# Patient Record
Sex: MALE | Race: WHITE | NOT HISPANIC OR LATINO | URBAN - METROPOLITAN AREA
[De-identification: names, ages, dates, MRNs, and addresses within clinical notes are randomized per-mention and may not be internally consistent; named-entity substitution may affect disease eponyms.]

---

## 2018-08-28 ENCOUNTER — RECORDS - HEALTHEAST (OUTPATIENT)
Dept: LAB | Facility: HOSPITAL | Age: 73
End: 2018-08-28

## 2018-08-28 LAB — RUBV IGG SERPL QL IA: POSITIVE

## 2018-08-29 LAB
MEV IGG SER IA-ACNC: POSITIVE
MUV IGG SER QL IA: POSITIVE
VZV IGG SER QL IA: POSITIVE

## 2018-08-30 LAB
GAMMA INTERFERON BACKGROUND BLD IA-ACNC: 0.11 IU/ML
M TB IFN-G BLD-IMP: NEGATIVE
MITOGEN IGNF BCKGRD COR BLD-ACNC: -0.01 IU/ML
MITOGEN IGNF BCKGRD COR BLD-ACNC: 0.03 IU/ML
QTF INTERPRETATION: NORMAL
QTF MITOGEN - NIL: >10 IU/ML

## 2019-10-08 ENCOUNTER — AMBULATORY - HEALTHEAST (OUTPATIENT)
Dept: CARDIAC REHAB | Facility: CLINIC | Age: 74
End: 2019-10-08

## 2019-10-08 DIAGNOSIS — Z95.1 S/P CABG X 4: ICD-10-CM

## 2019-10-15 ENCOUNTER — AMBULATORY - HEALTHEAST (OUTPATIENT)
Dept: CARDIAC REHAB | Facility: CLINIC | Age: 74
End: 2019-10-15

## 2019-10-15 DIAGNOSIS — Z95.1 S/P CABG X 4: ICD-10-CM

## 2019-10-15 DIAGNOSIS — I21.4 NSTEMI (NON-ST ELEVATED MYOCARDIAL INFARCTION) (H): ICD-10-CM

## 2019-10-15 RX ORDER — ASPIRIN 325 MG
325 TABLET ORAL DAILY
Status: SHIPPED | COMMUNITY
Start: 2019-10-15

## 2019-10-15 RX ORDER — ACETAMINOPHEN 325 MG/1
650 TABLET ORAL EVERY 6 HOURS PRN
Status: SHIPPED | COMMUNITY
Start: 2019-10-15

## 2019-10-15 RX ORDER — AMIODARONE HYDROCHLORIDE 200 MG/1
200 TABLET ORAL DAILY
Status: SHIPPED | COMMUNITY
Start: 2019-10-15

## 2019-10-15 RX ORDER — WARFARIN SODIUM 1 MG/1
1 TABLET ORAL SEE ADMIN INSTRUCTIONS
Status: SHIPPED | COMMUNITY
Start: 2019-10-15

## 2019-10-15 RX ORDER — NITROGLYCERIN 0.4 MG/1
0.4 TABLET SUBLINGUAL EVERY 5 MIN PRN
Status: SHIPPED | COMMUNITY
Start: 2019-10-15

## 2019-10-15 RX ORDER — METOPROLOL SUCCINATE 25 MG/1
25 TABLET, EXTENDED RELEASE ORAL DAILY
Status: SHIPPED | COMMUNITY
Start: 2019-10-15

## 2019-10-15 RX ORDER — TAMSULOSIN HYDROCHLORIDE 0.4 MG/1
0.4 CAPSULE ORAL
Status: SHIPPED | COMMUNITY
Start: 2019-10-15

## 2019-10-15 RX ORDER — MULTIPLE VITAMINS W/ MINERALS TAB 9MG-400MCG
1 TAB ORAL DAILY
Status: SHIPPED | COMMUNITY
Start: 2019-10-15

## 2019-10-15 RX ORDER — BUPROPION HYDROCHLORIDE 300 MG/1
300 TABLET ORAL DAILY
Status: SHIPPED | COMMUNITY
Start: 2019-10-15

## 2019-10-15 RX ORDER — KETOCONAZOLE 20 MG/ML
SHAMPOO TOPICAL
Status: SHIPPED | COMMUNITY
Start: 2019-10-15

## 2019-10-15 RX ORDER — ROSUVASTATIN CALCIUM 5 MG/1
5 TABLET, COATED ORAL AT BEDTIME
Status: SHIPPED | COMMUNITY
Start: 2019-10-15

## 2019-10-15 RX ORDER — ESCITALOPRAM OXALATE 10 MG/1
10 TABLET ORAL DAILY
Status: SHIPPED | COMMUNITY
Start: 2019-10-15

## 2019-10-18 ENCOUNTER — AMBULATORY - HEALTHEAST (OUTPATIENT)
Dept: CARDIAC REHAB | Facility: CLINIC | Age: 74
End: 2019-10-18

## 2019-10-18 DIAGNOSIS — Z95.1 S/P CABG X 4: ICD-10-CM

## 2019-10-18 DIAGNOSIS — I21.4 NSTEMI (NON-ST ELEVATED MYOCARDIAL INFARCTION) (H): ICD-10-CM

## 2019-10-21 ENCOUNTER — AMBULATORY - HEALTHEAST (OUTPATIENT)
Dept: CARDIAC REHAB | Facility: CLINIC | Age: 74
End: 2019-10-21

## 2019-10-21 DIAGNOSIS — Z95.1 S/P CABG X 4: ICD-10-CM

## 2019-10-21 DIAGNOSIS — I21.4 NSTEMI (NON-ST ELEVATED MYOCARDIAL INFARCTION) (H): ICD-10-CM

## 2019-10-25 ENCOUNTER — AMBULATORY - HEALTHEAST (OUTPATIENT)
Dept: CARDIAC REHAB | Facility: CLINIC | Age: 74
End: 2019-10-25

## 2019-10-25 DIAGNOSIS — I21.4 NSTEMI (NON-ST ELEVATED MYOCARDIAL INFARCTION) (H): ICD-10-CM

## 2019-10-25 DIAGNOSIS — Z95.1 S/P CABG X 4: ICD-10-CM

## 2019-10-28 ENCOUNTER — AMBULATORY - HEALTHEAST (OUTPATIENT)
Dept: CARDIAC REHAB | Facility: CLINIC | Age: 74
End: 2019-10-28

## 2019-10-28 DIAGNOSIS — I21.4 NSTEMI (NON-ST ELEVATED MYOCARDIAL INFARCTION) (H): ICD-10-CM

## 2019-10-28 DIAGNOSIS — Z95.1 S/P CABG X 4: ICD-10-CM

## 2019-11-01 ENCOUNTER — AMBULATORY - HEALTHEAST (OUTPATIENT)
Dept: CARDIAC REHAB | Facility: CLINIC | Age: 74
End: 2019-11-01

## 2019-11-01 DIAGNOSIS — Z95.1 S/P CABG X 4: ICD-10-CM

## 2019-11-01 DIAGNOSIS — I21.4 NSTEMI (NON-ST ELEVATED MYOCARDIAL INFARCTION) (H): ICD-10-CM

## 2019-11-04 ENCOUNTER — AMBULATORY - HEALTHEAST (OUTPATIENT)
Dept: CARDIAC REHAB | Facility: CLINIC | Age: 74
End: 2019-11-04

## 2019-11-04 DIAGNOSIS — Z95.1 S/P CABG X 4: ICD-10-CM

## 2019-11-04 DIAGNOSIS — I21.4 NSTEMI (NON-ST ELEVATED MYOCARDIAL INFARCTION) (H): ICD-10-CM

## 2019-11-06 ENCOUNTER — AMBULATORY - HEALTHEAST (OUTPATIENT)
Dept: CARDIAC REHAB | Facility: CLINIC | Age: 74
End: 2019-11-06

## 2019-11-06 DIAGNOSIS — I21.4 NSTEMI (NON-ST ELEVATED MYOCARDIAL INFARCTION) (H): ICD-10-CM

## 2019-11-06 DIAGNOSIS — Z95.1 S/P CABG X 4: ICD-10-CM

## 2019-11-08 ENCOUNTER — AMBULATORY - HEALTHEAST (OUTPATIENT)
Dept: CARDIAC REHAB | Facility: CLINIC | Age: 74
End: 2019-11-08

## 2019-11-08 DIAGNOSIS — I21.4 NSTEMI (NON-ST ELEVATED MYOCARDIAL INFARCTION) (H): ICD-10-CM

## 2019-11-08 DIAGNOSIS — Z95.1 S/P CABG X 4: ICD-10-CM

## 2019-11-11 ENCOUNTER — AMBULATORY - HEALTHEAST (OUTPATIENT)
Dept: CARDIAC REHAB | Facility: CLINIC | Age: 74
End: 2019-11-11

## 2019-11-11 DIAGNOSIS — I21.4 NSTEMI (NON-ST ELEVATED MYOCARDIAL INFARCTION) (H): ICD-10-CM

## 2019-11-11 DIAGNOSIS — Z95.1 S/P CABG X 4: ICD-10-CM

## 2019-11-15 ENCOUNTER — AMBULATORY - HEALTHEAST (OUTPATIENT)
Dept: CARDIAC REHAB | Facility: CLINIC | Age: 74
End: 2019-11-15

## 2019-11-15 DIAGNOSIS — I21.4 NSTEMI (NON-ST ELEVATED MYOCARDIAL INFARCTION) (H): ICD-10-CM

## 2019-11-15 DIAGNOSIS — Z95.1 S/P CABG X 4: ICD-10-CM

## 2019-11-18 ENCOUNTER — AMBULATORY - HEALTHEAST (OUTPATIENT)
Dept: CARDIAC REHAB | Facility: CLINIC | Age: 74
End: 2019-11-18

## 2019-11-18 DIAGNOSIS — Z95.1 S/P CABG X 4: ICD-10-CM

## 2019-11-18 DIAGNOSIS — I21.4 NSTEMI (NON-ST ELEVATED MYOCARDIAL INFARCTION) (H): ICD-10-CM

## 2019-11-22 ENCOUNTER — AMBULATORY - HEALTHEAST (OUTPATIENT)
Dept: CARDIAC REHAB | Facility: CLINIC | Age: 74
End: 2019-11-22

## 2019-11-22 DIAGNOSIS — Z95.1 S/P CABG X 4: ICD-10-CM

## 2019-11-22 DIAGNOSIS — I21.4 NSTEMI (NON-ST ELEVATED MYOCARDIAL INFARCTION) (H): ICD-10-CM

## 2019-11-25 ENCOUNTER — AMBULATORY - HEALTHEAST (OUTPATIENT)
Dept: CARDIAC REHAB | Facility: CLINIC | Age: 74
End: 2019-11-25

## 2019-11-25 DIAGNOSIS — Z95.1 S/P CABG X 4: ICD-10-CM

## 2019-11-25 DIAGNOSIS — I21.4 NSTEMI (NON-ST ELEVATED MYOCARDIAL INFARCTION) (H): ICD-10-CM

## 2019-11-29 ENCOUNTER — AMBULATORY - HEALTHEAST (OUTPATIENT)
Dept: CARDIAC REHAB | Facility: CLINIC | Age: 74
End: 2019-11-29

## 2019-11-29 DIAGNOSIS — Z95.1 S/P CABG X 4: ICD-10-CM

## 2019-11-29 DIAGNOSIS — I21.4 NSTEMI (NON-ST ELEVATED MYOCARDIAL INFARCTION) (H): ICD-10-CM

## 2019-12-02 ENCOUNTER — AMBULATORY - HEALTHEAST (OUTPATIENT)
Dept: CARDIAC REHAB | Facility: CLINIC | Age: 74
End: 2019-12-02

## 2019-12-02 DIAGNOSIS — Z95.1 S/P CABG X 4: ICD-10-CM

## 2019-12-02 DIAGNOSIS — I21.4 NSTEMI (NON-ST ELEVATED MYOCARDIAL INFARCTION) (H): ICD-10-CM

## 2019-12-04 ENCOUNTER — AMBULATORY - HEALTHEAST (OUTPATIENT)
Dept: CARDIAC REHAB | Facility: CLINIC | Age: 74
End: 2019-12-04

## 2019-12-04 DIAGNOSIS — I21.4 NSTEMI (NON-ST ELEVATED MYOCARDIAL INFARCTION) (H): ICD-10-CM

## 2019-12-04 DIAGNOSIS — Z95.1 S/P CABG X 4: ICD-10-CM

## 2019-12-11 ENCOUNTER — AMBULATORY - HEALTHEAST (OUTPATIENT)
Dept: CARDIAC REHAB | Facility: CLINIC | Age: 74
End: 2019-12-11

## 2019-12-11 DIAGNOSIS — Z95.1 S/P CABG X 4: ICD-10-CM

## 2019-12-11 DIAGNOSIS — I21.4 NSTEMI (NON-ST ELEVATED MYOCARDIAL INFARCTION) (H): ICD-10-CM

## 2019-12-13 ENCOUNTER — AMBULATORY - HEALTHEAST (OUTPATIENT)
Dept: CARDIAC REHAB | Facility: CLINIC | Age: 74
End: 2019-12-13

## 2019-12-13 DIAGNOSIS — Z95.1 S/P CABG X 4: ICD-10-CM

## 2019-12-13 DIAGNOSIS — I21.4 NSTEMI (NON-ST ELEVATED MYOCARDIAL INFARCTION) (H): ICD-10-CM

## 2019-12-16 ENCOUNTER — AMBULATORY - HEALTHEAST (OUTPATIENT)
Dept: CARDIAC REHAB | Facility: CLINIC | Age: 74
End: 2019-12-16

## 2019-12-16 DIAGNOSIS — Z95.1 S/P CABG X 4: ICD-10-CM

## 2019-12-16 DIAGNOSIS — I21.4 NSTEMI (NON-ST ELEVATED MYOCARDIAL INFARCTION) (H): ICD-10-CM

## 2019-12-18 ENCOUNTER — AMBULATORY - HEALTHEAST (OUTPATIENT)
Dept: CARDIAC REHAB | Facility: CLINIC | Age: 74
End: 2019-12-18

## 2019-12-18 DIAGNOSIS — Z95.1 S/P CABG X 4: ICD-10-CM

## 2019-12-18 DIAGNOSIS — I21.4 NSTEMI (NON-ST ELEVATED MYOCARDIAL INFARCTION) (H): ICD-10-CM

## 2019-12-20 ENCOUNTER — AMBULATORY - HEALTHEAST (OUTPATIENT)
Dept: CARDIAC REHAB | Facility: CLINIC | Age: 74
End: 2019-12-20

## 2019-12-20 DIAGNOSIS — I21.4 NSTEMI (NON-ST ELEVATED MYOCARDIAL INFARCTION) (H): ICD-10-CM

## 2019-12-20 DIAGNOSIS — Z95.1 S/P CABG X 4: ICD-10-CM

## 2019-12-23 ENCOUNTER — AMBULATORY - HEALTHEAST (OUTPATIENT)
Dept: CARDIAC REHAB | Facility: CLINIC | Age: 74
End: 2019-12-23

## 2019-12-23 DIAGNOSIS — I21.4 NSTEMI (NON-ST ELEVATED MYOCARDIAL INFARCTION) (H): ICD-10-CM

## 2019-12-23 DIAGNOSIS — Z95.1 S/P CABG X 4: ICD-10-CM

## 2019-12-27 ENCOUNTER — AMBULATORY - HEALTHEAST (OUTPATIENT)
Dept: CARDIAC REHAB | Facility: CLINIC | Age: 74
End: 2019-12-27

## 2019-12-27 DIAGNOSIS — Z95.1 S/P CABG X 4: ICD-10-CM

## 2019-12-27 DIAGNOSIS — I21.4 NSTEMI (NON-ST ELEVATED MYOCARDIAL INFARCTION) (H): ICD-10-CM

## 2019-12-30 ENCOUNTER — AMBULATORY - HEALTHEAST (OUTPATIENT)
Dept: CARDIAC REHAB | Facility: CLINIC | Age: 74
End: 2019-12-30

## 2019-12-30 DIAGNOSIS — I21.4 NSTEMI (NON-ST ELEVATED MYOCARDIAL INFARCTION) (H): ICD-10-CM

## 2019-12-30 DIAGNOSIS — Z95.1 S/P CABG X 4: ICD-10-CM

## 2020-01-03 ENCOUNTER — AMBULATORY - HEALTHEAST (OUTPATIENT)
Dept: CARDIAC REHAB | Facility: CLINIC | Age: 75
End: 2020-01-03

## 2020-01-03 DIAGNOSIS — Z95.1 S/P CABG X 4: ICD-10-CM

## 2020-01-03 DIAGNOSIS — I21.4 NSTEMI (NON-ST ELEVATED MYOCARDIAL INFARCTION) (H): ICD-10-CM

## 2021-05-29 ENCOUNTER — HEALTH MAINTENANCE LETTER (OUTPATIENT)
Age: 76
End: 2021-05-29

## 2021-06-02 NOTE — PROGRESS NOTES
Erik Anderson Jr. has participated in 7 sessions of Phase II Cardiac Rehab.    Progress Report:   Cardiac Rehab Treatment Progress Report 10/15/2019 10/18/2019 10/21/2019 10/25/2019 10/28/2019   Weight 259 lbs 8 oz 260 lbs 5 oz 261 lbs 8 oz 262 lbs 5 oz 261 lbs 13 oz   Pre Exercise  HR 66 63 56 57 62   Pre Exercise /64 112/68 114/68 104/64 118/62   Pre Blood Sugar (mg/dl) na - - - -   Treadmill Peak HR - 78 72 77 79   Treadmill Peak Blood Pressure - 128/62 130/72 136/70 116/68- mild lightheadedness   Heart Rate 65 64 58 59 60   Post Exercise /60 114/62 110/68 112/62 96/64-slight lightheadedness  Rechecked- 104/70- no sx-pt drinking water   Post Blood Sugar (mg/dl) na na - - -   ECG SR with BBB SR w/BBB SR w/BBB SR/BBB SR/BBB w/biphasic T waves/inverted   Total Exercise Minutes 6 30 40 40 35         Current Status:  Symptomatic Sternal/muscle aches into L shoulder- lack of ROM, numbness in pinky of L hand since surgery. Lightheadedness noted on the TM w/flat bp response-resolved w/rest and water. Orthostatics were 112/68-sitting and 114/68-standing- no sx. Pt has only had about 4 oz of water prior to rehab and 2 cups of coffee. Pt does note at home positional lightheadedness.  Pt slightly lightheaded post exercise- encouraged water and bp came up and pt asymptomatic.  and Therapists Comments: Pt progressing well and walking on non rehab days.     If Physician recommends change in treatment plan, please place orders.        __________________________________________________      _____________  Signature                                                                                                  Date

## 2021-06-02 NOTE — PROGRESS NOTES
Cardiac Rehab  Phase II Assessment    Assessment Date: 10/15/2019    Diagnosis: CAD, NSTEMI  Date of Onset: 9/13/2019  Procedure: CAB x 4  Date of Onset: 9/27/2019  ICD/Pacemaker: No Parameters: na  Post-op Complications: none  ECG History: SR with RBBB, paroxysmal a-fib EF%:55-60  Past Medical History:   Cardiac history of BMS x 2 to circ. And RCA in 1999  Cardiac risk factors: Hypertension, Dyslipidemia, Overweight, pre-diabetes, family history , stress  QUANG, GERD, Depression, h/o basal cell Ca, hearing loss,    Physical Assessment  Precautions/ Physical Limitations: surgical, R elbow, [place]. hips  Oxygen: No  O2 Sats: 99 Lung Sounds: clear Edema: +2 L ankle, +1 R ankle  Incisions: healing well  Sleeping Pattern: good   Appetite: good   Nutrition Risk Screen: No concerns    Pain  Location: L chest and L arm  Characteristics:soreness  Intensity: (0-10 scale) 2  Current Pain Management: Acetaminophen  Intervention: helps  Response: helps    Psychosocial/ Emotional Health  1. In the past 12 months, have you been in a relationship where you have been abused physically, emotionally, sexually or financially? No  notified: NA  2. Who do you turn to for emotional support?: wife, book club members  3. Do you have cultural or spiritual needs? No  4. Have there been any major life changes in the past 12 months? Yes heart surgery    Referral Information  Primary Physician: Chastity Stark MD  Cardiologist: Dr. Odell  Surgeon: Wilfredo Bravo, DO    Home exercise/Equipment: Treadmill, weights    Patient's long-term goal(s): Return to volunteering, resume walking on treadmill for 40 min. strength program    1. Living Accommodations: Home Steps: Yes      Support people at home: wife   2. Marital Status:   3. Family is able to assist with cares      Rastafari/Community involvement: volunteer   4. Recreation/Hobbies: reading, watching sports

## 2021-06-02 NOTE — PROGRESS NOTES
ITP ASSESSMENT   Assessment Day: Initial    Session Number: 1/2  Precautions: surgical    Diagnosis: CAB    Risk Stratification: High    Referring Provider: Arthur Bravo Pe*   ITP sent to Dr. Tamayo  EXERCISE  Exercise Assessment: Initial       6 Minute Walk Test   Pre   Pre Exercise HR: 66                    Pre Exercise BP: 116/64      Peak  Peak HR: 77                   Peak BP: 138/64    Peak feet: 1075    Peak O2 SAT: 98    Peak RPE: 6    Peak MPH: 2.04      Symptoms:  Peak Symptoms: none      5 mins. Post  5 Min Post HR: 65    5 Min Post BP: 114/60                           Exercise Plan  Goals Next 30 days  ADL'S: Resume 1 flight of stairs without fatigue    Leisure: Increase walking on treadmill to 20 min. 2-3x/week    Work: Long term goal to do strength training 1-2x/week and to increase walking on treadmill to 40 min.    Education Goals: Patient can state cardiac s/s and appropriate emergency response.;Has system for taking medication.;Medication review    Exercise Prescription  Exercise Mode: Treadmill;Bike;Nustep;Arm Erg.;Stairs;Hallway Walking    Frequency: 2x/week    Duration: 30-40 min.    Intensity / THR: 20-30 beats above resting heart rate    RPE 11-14  Progression / Met level: 3-4    Resistive Training?: No      Current Exercise (mins/week): 100      Interventions  Home Exercise:  Mode: walking    Frequency: 2-3x/week    Duration: 15 min.      Education Material : Individual education and counseling      Education Completed  Exercise Education Completed: Cardiac Anatomy;Signs and Symptoms;Medication review;RPE;Emergency Plan;Home Exercise;Warm up/cool down;FITT Principles;BP/HR Reponse to exercise;Stretching;Strength training;Benefits of Exercise;End point of exercise              Exercise Follow-up/Discharge  Follow up/Discharge: Skilled therapy needed to monitor cv response post CAB.  To provide guidance and education regarding activity progresion to 4.5 METs.   NUTRITION  Nutrition  Assessment: Initial      Nutrition Risk Factors:  Nutrition Risk Factors: Dyslipidemia;Overweight  Cholesterol: 199 (9/25/2018)  LDL: 121  HDL: 45  Triglycerides: 166      Nutrition Plan  Interventions  Other Nutrition Intervention: Therapist/Pt Discussion      Education Completed  Nutrition Education Completed: Low Saturated fat diet;Risk factor overview;Low sodium diet      Goals  Nutrition Goals (Next 30 days): Patient will follow a low sodium diet;Patient will follow a low saturated fat diet      Goals Met  Nutrition Goals Met: Provided Rate your Plate Survey;Reviewed Dietitian schedule      Height, Weight, and  BMI  Weight: 259 lb 8 oz (117.7 kg)  Height: 6' (1.829 m)  BMI: 35.19      Nutrition Follow-up  Follow-up/Discharge: Encourage appt. to meet with dietician.         Other Risk Factors  Other Risk Factor Assessment: Initial      HTN Risk Factor: Hypertension      Pre Exercise BP: 116/64  Post Exercise BP: 114/60      Hypertension Plan  Goals  HTN Goals: Follow low sodium diet;Exercises regularly      Goals Met  HTN Goals Met: Take medication as prescribed      HTN Interventions  HTN Interventions: Therapist/patient discussion      HTN Education Completed  HTN Education Completed: Low sodium diet;Medication review;Risk factor overview      Tobacco Risk Factor: NA      Risk Factor Follow-up   Follow-up/Discharge: Family history     PSYCHOSOCIAL  Psychosocial Assessment: Initial       [facility] Q of L Summary Score: 18      PHQ-9 Total Score: 2      Psychosocial Risk Factor: Stress      Psychosocial Plan  Interventions  If PHQ-9 is >9, send letter to MD  Interventions: Individual education and counseling       Education Completed  Education Completed: Relaxation/Coping Techniques;S/S of depression;Effects of stress on body      Goals  Goals (Next 30 days): Practicing stress management skills      Goals Met  Goals Met: Identified Support system      Psychosocial Follow-up  Follow-up/Discharge: Patient  does report some stress.  Patient does enjoy reading, watching sports and walking for relaxation.  Patient reports good support system with wife and members of his book club.       Patient involved in Goal setting?: Yes      Signature: _____________________________________________________________    Date: __________________    Time: __________________

## 2021-06-03 VITALS — WEIGHT: 264.2 LBS

## 2021-06-03 VITALS — WEIGHT: 267.7 LBS

## 2021-06-03 VITALS — WEIGHT: 259.5 LBS

## 2021-06-03 VITALS — WEIGHT: 265.8 LBS

## 2021-06-03 VITALS — WEIGHT: 262 LBS

## 2021-06-03 VITALS — WEIGHT: 263.9 LBS

## 2021-06-03 VITALS — WEIGHT: 264.5 LBS

## 2021-06-03 VITALS — WEIGHT: 265 LBS

## 2021-06-03 VITALS — WEIGHT: 260.3 LBS

## 2021-06-03 VITALS — WEIGHT: 266.5 LBS

## 2021-06-03 VITALS — WEIGHT: 261.8 LBS

## 2021-06-03 VITALS — WEIGHT: 262.6 LBS

## 2021-06-03 VITALS — WEIGHT: 266 LBS

## 2021-06-03 VITALS — WEIGHT: 264.8 LBS

## 2021-06-03 VITALS — WEIGHT: 261.5 LBS

## 2021-06-03 VITALS — WEIGHT: 262.3 LBS

## 2021-06-03 VITALS — WEIGHT: 266.2 LBS

## 2021-06-03 VITALS — WEIGHT: 269 LBS

## 2021-06-03 VITALS — WEIGHT: 263.1 LBS

## 2021-06-03 VITALS — WEIGHT: 262.4 LBS

## 2021-06-03 VITALS — WEIGHT: 267.9 LBS

## 2021-06-03 VITALS — WEIGHT: 269.5 LBS

## 2021-06-03 VITALS — WEIGHT: 268.4 LBS

## 2021-06-03 VITALS — WEIGHT: 264.4 LBS

## 2021-06-03 VITALS — WEIGHT: 265.4 LBS

## 2021-06-03 NOTE — PROGRESS NOTES
Erik Anderson Jr. has participated in 14 sessions of Phase II Cardiac Rehab.    Progress Report:   Cardiac Rehab Treatment Progress Report 11/4/2019 11/6/2019 11/8/2019 11/11/2019 11/15/2019   Weight 265 lbs 264 lbs 13 oz 264 lbs 6 oz 263 lbs 14 oz 265 lbs 6 oz   Pre Exercise  HR 55 57 52 55 53   Pre Exercise /70 120/62 114/60 112/70 128/70   Pre Blood Sugar (mg/dl) - - - - -   Treadmill Peak HR 78 81 81 78 79   Treadmill Peak Blood Pressure 132/66 136/58 - - 140/64   Nustep Peak Heart Rate - - - 68 72   Nustep Peak Blood Pressure - - - 130/68 -   Heart Rate 63 62 62 60 58   Post Exercise /68 128/78 110/58 110/62 120/70   Post Blood Sugar (mg/dl) - - - - -   ECG SR/BBB SR/BBB SR/BBB SB/SR/BBB w/TWI in inferior leads at rest-more upright with exercise.  SB/BBB   Total Exercise Minutes 47 40 40 45 46         Current Status:  Therapists Comments: Pt has been denying sx in rehab.  He is tolerating ex well, and reports walking regularly at home on his TM.    If Physician recommends change in treatment plan, please place orders.        __________________________________________________      _____________  Signature                                                                                                  Date

## 2021-06-03 NOTE — PROGRESS NOTES
ITP ASSESSMENT   Assessment Day: 30 Day    Session Number: 12  Precautions: Surgical    Diagnosis: CAB    Risk Stratification: High    Referring Provider: Chastity Stark MD   ITP: Dr. Tamayo  EXERCISE  Exercise Assessment: Reassessment                           Exercise Plan  Goals Next 30 days  ADL'S: Goal #1; Resume making 1 bed without SOB/fatigue    Leisure: Goal #2: Resume grocery shopping for 15 min. without fatigue and carrying light bags 10-15lbs. into house for 3-5 min.    Work: LTG: Pt to do strength training 1-2x/week in cardiac rehab now that he is off arm restrictions and increase TM duration to 40 minutes at home or at cardiac rehab without complications.       Education Goals: All goals in this section met    Education Goals Met: Medication review.;Has system for taking medication.;Patient can state cardiac s/s and appropriate emergency response.                          Goals Met  Initial ADL's goals met: Goal MET: Pt has resumed 1 flight of 15 stairs at home without complication and did tolerate 5 min at a 4.5 MET level in cardiac rehab without any cv sx/sx.     Initial Leisure goals met: Goal MET: Pt has increased walking on TM 20-30 minutes at home 2-3x/week without any cv sx/sx utilizing warm up and cool down.     Intial Work goals met: LTG: Pt to do strength training 1-2x/week in cardiac rehab ( now that he is off arm restriction as of 11/8) and increase walking on the TM to 40 minutes without complication. Continue same goal.     Initial Progression: Pt has reached a 3.3 MET level for 10 minutes on the TM and 4.5 MET level on the stairs for 5 minutes and a 2.8 MET level on the bike for 30 minutes. Pt was limited by deconditioning/surgery but is now progressing well. Pt unable to use arms until 11/8 so limited with upper body modalities- change up modalities to increase MET level.       Exercise Prescription  Exercise Mode: Treadmill;Bike;Nustep;Arm Erg.;Stairs;Hallway  Walking    Frequency: 2x/week    Duration: 40-45 min.    Intensity / THR: 20-30 beats above resting heart rate    RPE 11-14  Progression / Met level: 3.5-4.5(stairs)-3.54.5+    Resistive Training?: Yes      Current Exercise (mins/week): 110      Interventions  Home Exercise:  Mode: walking, treadmill    Frequency: 2-3x/week    Duration: 30 min.      Education Material : Educational videos;Provide written material;Individual education and counseling;Offer educational classes      Education Completed  Exercise Education Completed: Cardiac Anatomy;Signs and Symptoms;Medication review;RPE;Home Exercise;Emergency Plan;FITT Principles;Warm up/cool down;BP/HR Reponse to exercise;Stretching;Benefits of Exercise;Strength training;End point of exercise              Exercise Follow-up/Discharge  Follow up/Discharge: Continues to need skilled therapy to monitor cv response post CAB, also to safely start arm use post CAB.  To provide guidance and education regarding activity progresion to 4.5 METs.           NUTRITION  Nutrition Assessment: Reassessment      Nutrition Risk Factors:  Nutrition Risk Factors: Dyslipidemia;Overweight  Cholesterol: 199 (9/25/2018)  LDL: 121  HDL: 45  Triglycerides: 166      Nutrition Plan  Interventions  Diet Consult: Completed    Other Nutrition Intervention: Therapist/Pt Discussion;Provide with Written Material    Initial Rate Your Plate Score: 59      Education Completed  Nutrition Education Completed: Low Saturated fat diet;Risk factor overview;Low sodium diet;Weight management      Goals  Nutrition Goals (Next 30 days): Patient can identify their risk factors for CAD;Patient will follow a low sodium diet;Patient knows appropriate portion size;Patient will lose weight      Goals Met  Nutrition Goals Met: Completed Nutritional Risk Screen;Provided Rate your Plate Survey;Reviewed Dietitian schedule;Patient states following a low saturated fat diet      Height, Weight, and  BMI  Weight: 263 lb 14.4  oz (119.7 kg)  Height: 6' (1.829 m)  BMI: 35.78      Nutrition Follow-up  Follow-up/Discharge: Pt met with the dietician with wife. Pt's wife does the cooking. Pt has made changes regarding his saturated fat by limiting and decreasing his sweet intake and also eating less red meat. Reviewed low sodium and pt does add salt. Pt to throw the salt shaker away and he is motivated to try to do this. Reviewed label reading and using ms. dash for low sodium intake. Pt feels he is following low saturated fat >70% of the time but not necessarily low sodium. Pt is drinking water for hydration. Pt has decreased his eating out to 3x/week. Pt is compliant with statin medication.        Other Risk Factors  Other Risk Factor Assessment: Reassessment      HTN Risk Factor: Hypertension      Pre Exercise BP: 112/70  Post Exercise BP: 110/58      Hypertension Plan  Goals  HTN Goals: Follow low sodium diet      Goals Met  HTN Goals Met: Take medication as prescribed;Exercises regularly      HTN Interventions  HTN Interventions: Diet consult;Therapist/patient discussion;Provide written material;Offer educational classes (Pt met with dietician. )      HTN Education Completed  HTN Education Completed: Low sodium diet;Medication review;Risk factor overview      Tobacco Risk Factor: NA        Risk Factor Follow-up   Follow-up/Discharge: Resting BP and Exercise BP are WNL. Pt compliant with metoprolol for blood pressure.          PSYCHOSOCIAL  Psychosocial Assessment: Reassessment       [place] CO[employer] Q of L Summary Score: 18      PHQ-9 Total Score: 2      Psychosocial Risk Factor: Stress      Psychosocial Plan  Interventions  Interventions: Offer educational videos and classes;Provide written material;Individual education and counseling      Education Completed  Education Completed: Relaxation/Coping Techniques;S/S of depression;Effects of stress on body      Goals  Goals (Next 30 days): Improvement in [employer] COOP score      Goals  Met  Goals Met: Identified Support system;Oriented to stress management classes;Identify stressors;Practicing stress management skills      Psychosocial Follow-up  Follow-up/Discharge: Pt does report moderate stress and enjoys reading and watching TV for relaxation. Pt has a good support system in his wife and does feel he is coping well and optimistic about his health. Pt enjoys cardiac rehab and does continues to feel emotionally and physically better every day. Pt also enjoys walking for relaxation.            Patient involved in Goal setting?: Yes

## 2021-06-04 NOTE — PROGRESS NOTES
ITP ASSESSMENT   Assessment Day: 90 Day    Session Number: 30  Precautions: Surgical    Diagnosis: CAB    Risk Stratification: High    Referring Provider: Chastity Stark MD  EXERCISE  Exercise Assessment: Discharge       6 Minute Walk Test   Pre   Pre Exercise HR: 55                    Pre Exercise BP: 112/68      Peak  Peak HR: 78                   Peak BP: 158/80    Peak feet: 1485    Peak O2 SAT: 97    Peak RPE: 3    Peak MPH: 2.81      Symptoms:  Peak Symptoms: denies sx      5 mins. Post  5 Min Post HR: 54    5 Min Post BP: 136/78                           Exercise Plan  Goals Next 30 days    Education Goals: All goals in this section met                          Goals Met  60 day ADL'S goals met: Goal met- pt has resumed making bed without sx.    60 day Leisure goals met: Goal met- pt has resumed grocery shopping and carrying bags into the house without sx.    No data recorded  60 Day Progression: Pt to continue exercising by walking up to 6x/week at home on his TM.      30 day Work goals met: LTG: Pt to do strength training 1-2x/week in cardiac rehab now that he is off arm restrictions and increase TM duration to 40 minutes at home or at cardiac rehab without complications. Will asess on next ITP. Caution L shoulder pain with certain weight exercises.     30 Day Progression: Pt has reached a 4.3 MET level for 30 minutes on the TM, a 2.8-3 MET on the bikes for 25 minutes. Pt has utilized the arm ergometer 1.4 MEt for 5 minutes and weights 2lbs 1 set of 10 reps. Pt limited by L arm pain with weights above the head. Caution. Pt was not able to attend update session. WIll address on the next ITP. Continue same goals. PT may look into PT for L arm pain with movements. Will continue same goals/will assess on next ITP.       Initial ADL's goals met: Goal MET: Pt has resumed 1 flight of 15 stairs at home without complication and did tolerate 5 min at a 4.5 MET level in cardiac rehab without any cv sx/sx.      Initial Leisure goals met: Goal MET: Pt has increased walking on TM 20-30 minutes at home 2-3x/week without any cv sx/sx utilizing warm up and cool down.     Intial Work goals met: LTG: Pt to do strength training 1-2x/week in cardiac rehab ( now that he is off arm restriction as of 11/8) and increase walking on the TM to 40 minutes without complication. Continue same goal.     Initial Progression: Pt has reached a 3.3 MET level for 10 minutes on the TM and 4.5 MET level on the stairs for 5 minutes and a 2.8 MET level on the bike for 30 minutes. Pt was limited by deconditioning/surgery but is now progressing well. Pt unable to use arms until 11/8 so limited with upper body modalities- change up modalities to increase MET level.       Exercise Prescription  Exercise Mode: Treadmill;Bike;Nustep;Arm Erg.;Stairs;Hallway Walking    Frequency: 2x/week    Duration: 40-45 minutes    Intensity / THR: 20-30 beats above resting heart rate    RPE 11-14  Progression / Met level: 4.3-4.5(stairs)-4.5+    Resistive Training?: Yes      Current Exercise (mins/week): 180      Interventions  Home Exercise:  Mode: Walking, TM    Frequency: 4-6x/week    Duration: 30-40 mins      Education Material : Educational videos;Provide written material;Individual education and counseling;Offer educational classes      Education Completed  Exercise Education Completed: Cardiac Anatomy;Signs and Symptoms;Medication review;RPE;Emergency Plan;Home Exercise;Warm up/cool down;FITT Principles;BP/HR Reponse to exercise;Stretching;Strength training;Benefits of Exercise;End point of exercise              Exercise Follow-up/Discharge  Follow up/Discharge: Pt has reached peak of   NUTRITION  Nutrition Assessment: Discharge      Nutrition Risk Factors:  Nutrition Risk Factors: Dyslipidemia;Overweight      Nutrition Plan  Interventions  Diet Consult: Completed    Other Nutrition Intervention: Diet Class;Therapist/Pt Discussion;Provide with Written  Material    Initial Rate Your Plate Score: 59    Follow-Up Rate Your Plate Score: 63      Education Completed  Nutrition Education Completed: Low Saturated fat diet;Low sodium diet;Weight management      Goals  Nutrition Goals (Next 30 days): Patient will follow a low sodium diet;Patient will follow a low saturated fat diet;Patient knows appropriate portion size;Patient will lose weight      Goals Met  Nutrition Goals Met: Patient follows a low sodium diet;Patient states following a low saturated fat diet;Patient knows appropriate portion size;Rate Your Plate Survey Score Improved      Height, Weight, and  BMI  Weight: 269 lb (122 kg)  Height: 6' (1.829 m)  BMI: 36.48      Nutrition Follow-up  Follow-up/Discharge: Pt has previously met with the dietician with wife. Pt's wife does the cooking. Pt has made changes regarding his saturated fat by limiting and decreasing his sweet intake and also eating less red meat. Reviewed low sodium and pt does add salt. Pt to throw the salt shaker away and he is motivated to try to do this. Reviewed label reading and using ms. dash for low sodium intake. Pt feels he is following low saturated fat >70% of the time but not necessarily low sodium. Pt is drinking water for hydration. Pt has decreased his eating out to 3x/week. Pt is compliant with statin medication.  He has no additional dietary concerns, and pt is confident he will continue eating a heart healthy diet.         Other Risk Factors  Other Risk Factor Assessment: Discharge      HTN Risk Factor: Hypertension      Pre Exercise BP: 130/76  Post Exercise BP: 126/82      Hypertension Plan  Goals  HTN Goals: Follow low sodium diet      Goals Met  HTN Goals Met: Take medication as prescribed;Exercises regularly      HTN Interventions  HTN Interventions: Diet consult;Therapist/patient discussion;Provide written material;Offer educational classes      HTN Education Completed  HTN Education Completed: Low sodium diet;Medication  review;Risk factor overview      Tobacco Risk Factor: NA        Risk Factor Follow-up   Follow-up/Discharge: Pt's resting BP and exercise BP continue to be WNL. Pt compliant with metoprolol for bp.     PSYCHOSOCIAL  Psychosocial Assessment: Discharge     Pre Score: 18  Post Score  [place] COOP Q of L Summary Score: 14    Pre Score:2  Post Score:  PHQ-9 Total Score: 0      Psychosocial Risk Factor: Stress      Psychosocial Plan  Interventions  Interventions: Offer educational videos and classes;Provide written material;Individual education and counseling      Education Completed  Education Completed: Relaxation/Coping Techniques;S/S of depression;Effects of stress on body      Goals  Goals (Next 30 days): Improvement in [employer] COOP score      Goals Met  Goals Met: Identified Support system;Oriented to stress management classes;Identify stressors;Practicing stress management skills      Psychosocial Follow-up  Follow-up/Discharge: Pt continues to report moderate stress and enjoys reading and watching TV for relaxation. Pt has a good support system in his wife and does feel he is coping well and optimistic about his health. Pt enjoys cardiac rehab and does continues to feel emotionally and physically better every day. Pt also enjoys walking for relaxation.  Pt is exercising regularly at home.             Patient involved in Goal setting?: Yes      Signature: _____________________________________________________________    Date: __________________    Time: __________________

## 2021-06-04 NOTE — PROGRESS NOTES
[facility] Home Exercise Program/Discharge Summary  You have reached a 5.2 MET level and have completed 30 sessions of Cardiac Rehab.   Exercise Goals:   4-6x/week for aerobic exercise 30-60 minutes and 2-3x/week for strength training.   Modality Duration Intensity/  Rate of Perceived Exertion  OMNI Scale (1-10)   Warm-up 5 minutes 2-3   Walk 30-45 mins 4-7   Bike 30-45 mins 4-7   Treadmill 30-45 mins 3mph/4.5%         Cool Down 5 minutes 4-7   Strength Training *every other day 10-15 minutes 3 sets of 10 reps  Increase weights as tolerated.   Stretching 5 minutes 2-3   Continuous Exercise Heart Rate Guidelines:   20-30bpm> RHR (Resting Heart Rate)    Special Recommendations:    Continue to follow low fat, low salt, heart healthy diet.    Continue to follow up with your doctors/providers as recommended (e.g cholesterol).    A well rounded exercise program will included aerobic/cardiovascular exercise (e.g like walking, biking, or swimming ), strength training (e.g. free weights, exercise bands, or weight machines) and stretching program.   Stop Exercise!!! If any of the following occur:    Angina/chest pain    Dizziness    Excessive perspiration/cold sweats    Abnormal shortness of breath    Changes in heart rate (slow, fast, irregular)    Sudden fatigue or numbness    Nausea  Also...    Avoid extreme temperatures - exercise indoors if necessary:   Temp+ Humidity >160, Temp-Wind Chill <20    Wait at least 1 hour after a meal before strenuous activity    Do not exercise if you have a fever or are ill    Wear comfortable, supportive athletic clothing and shoes.  You are now on your way to a heart healthy lifestyle on your own. You can do it!

## 2021-06-04 NOTE — PROGRESS NOTES
ITP ASSESSMENT   Assessment Day: 60 Day    Session Number: 21  Precautions: Surgical    Diagnosis: CAB    Risk Stratification: High    Referring Provider: Chastity Stark MD   ITP: Dr. Tamayo  EXERCISE  Exercise Assessment: Reassessment                              Exercise Plan  Goals Next 30 days  No data recorded  No data recorded  Work: LTG: Pt to do strength training 1-2x/week in cardiac rehab now that he is off arm restrictions and increase TM duration to 40 minutes at home or at cardiac rehab without complications.       Education Goals: All goals in this section met    Education Goals Met: Medication review.;Has system for taking medication.;Patient can state cardiac s/s and appropriate emergency response.                          Goals Met  No data recorded  No data recorded  30 day Work goals met: LTG: Pt to do strength training 1-2x/week in cardiac rehab now that he is off arm restrictions and increase TM duration to 40 minutes at home or at cardiac rehab without complications. Will asess on next ITP. Caution L shoulder pain with certain weight exercises.     30 Day Progression: Pt has reached a 4.3 MET level for 30 minutes on the TM, a 2.8-3 MET on the bikes for 25 minutes. Pt has utilized the arm ergometer 1.4 MEt for 5 minutes and weights 2lbs 1 set of 10 reps. Pt limited by L arm pain with weights above the head. Caution. Pt was not able to attend update session. WIll address on the next ITP. Continue same goals. PT may look into PT for L arm pain with movements.       Initial ADL's goals met: Goal MET: Pt has resumed 1 flight of 15 stairs at home without complication and did tolerate 5 min at a 4.5 MET level in cardiac rehab without any cv sx/sx.     Initial Leisure goals met: Goal MET: Pt has increased walking on TM 20-30 minutes at home 2-3x/week without any cv sx/sx utilizing warm up and cool down.     Intial Work goals met: LTG: Pt to do strength training 1-2x/week in cardiac rehab ( now  that he is off arm restriction as of 11/8) and increase walking on the TM to 40 minutes without complication. Continue same goal.     Initial Progression: Pt has reached a 3.3 MET level for 10 minutes on the TM and 4.5 MET level on the stairs for 5 minutes and a 2.8 MET level on the bike for 30 minutes. Pt was limited by deconditioning/surgery but is now progressing well. Pt unable to use arms until 11/8 so limited with upper body modalities- change up modalities to increase MET level.       Exercise Prescription  Exercise Mode: Treadmill;Bike;Nustep;Arm Erg.;Stairs;Hallway Walking    Frequency: 2x/week    Duration: 40-45 minutes    Intensity / THR:;20-30 beats above resting heart rate ([place] 110-125bpm)    RPE 11-14  Progression / Met level: 4.3-4.5(stairs)-4.5+    Resistive Training?: Yes      Current Exercise (mins/week): 145      Interventions  Home Exercise:  Mode: Walking, TM    Frequency: 2-3x/week    Duration: 30 minutes      Education Material : Educational videos;Provide written material;Individual education and counseling;Offer educational classes      Education Completed  Exercise Education Completed: Cardiac Anatomy;Signs and Symptoms;Medication review;RPE;Emergency Plan;Home Exercise;Warm up/cool down;FITT Principles;BP/HR Reponse to exercise;Stretching;Strength training;Benefits of Exercise;End point of exercise              Exercise Follow-up/Discharge  Follow up/Discharge: Skilled therapy to monitor cv response post CAB, also to safely start arm use post CAB.  To provide guidance and education regarding activity progresion to 4.5 METS. Pt is walking/TM for HEP. PT looking into PT for L arm pain but continues to use modalities as tolerated i.e arm ergometer and Nustep. Also, continue to change up modalities to increase MET level.    NUTRITION  Nutrition Assessment: Reassessment      Nutrition Risk Factors:  Nutrition Risk Factors: Dyslipidemia;Overweight      Nutrition Plan  Interventions  Diet  Consult: Completed    Other Nutrition Intervention: Diet Class;Therapist/Pt Discussion;Provide with Written Material    Initial Rate Your Plate Score: 59    Follow-Up Rate Your Plate Score: 63      Education Completed  Nutrition Education Completed: Low Saturated fat diet;Low sodium diet;Weight management      Goals  Nutrition Goals (Next 30 days): Patient will follow a low sodium diet;Patient will follow a low saturated fat diet;Patient knows appropriate portion size;Patient will lose weight      Goals Met  Nutrition Goals Met: Patient follows a low sodium diet;Patient states following a low saturated fat diet;Patient knows appropriate portion size;Rate Your Plate Survey Score Improved      Height, Weight, and  BMI  Weight: 264 lb 3.2 oz (119.8 kg)  Height: 6' (1.829 m)  BMI: 35.82      Nutrition Follow-up  Follow-up/Discharge: Pt met with the dietician with wife. Pt's wife does the cooking. Pt has made changes regarding his saturated fat by limiting and decreasing his sweet intake and also eating less red meat. Reviewed low sodium and pt does add salt. Pt to throw the salt shaker away and he is motivated to try to do this. Reviewed label reading and using ms. dash for low sodium intake. Pt feels he is following low saturated fat >70% of the time but not necessarily low sodium. Pt is drinking water for hydration. Pt has decreased his eating out to 3x/week. Pt is compliant with statin medication. Will assess on next ITP.          Other Risk Factors  Other Risk Factor Assessment: Reassessment      HTN Risk Factor: Hypertension      Pre Exercise BP: 118/70  Post Exercise BP: 102/64      Hypertension Plan  Goals  HTN Goals: Follow low sodium diet      Goals Met  HTN Goals Met: Take medication as prescribed;Exercises regularly      HTN Interventions  HTN Interventions: Diet consult;Therapist/patient discussion;Provide written material;Offer educational classes      HTN Education Completed  HTN Education Completed: Low  sodium diet;Medication review;Risk factor overview      Tobacco Risk Factor: NA          Risk Factor Follow-up   Follow-up/Discharge: Resting BP and Exercise BP are WNL. Pt compliant with metoprolol for bp. Will assess on next ITP.      PSYCHOSOCIAL  Psychosocial Assessment: Reassessment      Psychosocial Risk Factor: Stress      Psychosocial Plan  Interventions  Interventions: Offer educational videos and classes;Provide written material;Individual education and counseling      Education Completed  Education Completed: Relaxation/Coping Techniques;S/S of depression;Effects of stress on body      Goals  Goals (Next 30 days): Improvement in [employer] score      Goals Met  Goals Met: Identified Support system;Oriented to stress management classes;Identify stressors;Practicing stress management skills      Psychosocial Follow-up  Follow-up/Discharge: Pt does report moderate stress and enjoys reading and watching TV for relaxation. Pt has a good support system in his wife and does feel he is coping well and optimistic about his health. Pt enjoys cardiac rehab and does continues to feel emotionally and physically better every day. Pt also enjoys walking for relaxation. Will reassess on next ITP.             Patient involved in Goal setting?: (S) No (Pt cancelled today due to weather/will continue same goals)

## 2021-06-16 PROBLEM — I48.0 PAROXYSMAL ATRIAL FIBRILLATION (H): Status: ACTIVE | Noted: 2019-10-02

## 2021-06-16 PROBLEM — G47.33 OSA ON CPAP: Status: ACTIVE | Noted: 2018-09-25

## 2021-06-16 PROBLEM — K21.9 ESOPHAGEAL REFLUX: Status: ACTIVE | Noted: 2020-11-01

## 2021-06-16 PROBLEM — Z99.11 DEPENDENCE ON RESPIRATOR (VENTILATOR) STATUS (H): Status: ACTIVE | Noted: 2020-02-18

## 2021-06-16 PROBLEM — R73.9 STRESS HYPERGLYCEMIA: Status: ACTIVE | Noted: 2019-09-27

## 2021-06-16 PROBLEM — D69.6 THROMBOCYTOPENIA, UNSPECIFIED (H): Status: ACTIVE | Noted: 2020-02-18

## 2021-06-16 PROBLEM — I45.2 BIFASCICULAR BLOCK: Status: ACTIVE | Noted: 2019-09-27

## 2021-06-16 PROBLEM — Z95.1 S/P CABG X 4: Status: ACTIVE | Noted: 2019-09-27

## 2021-09-18 ENCOUNTER — HEALTH MAINTENANCE LETTER (OUTPATIENT)
Age: 76
End: 2021-09-18

## 2022-06-25 ENCOUNTER — HEALTH MAINTENANCE LETTER (OUTPATIENT)
Age: 77
End: 2022-06-25

## 2022-11-20 ENCOUNTER — HEALTH MAINTENANCE LETTER (OUTPATIENT)
Age: 77
End: 2022-11-20

## 2023-07-02 ENCOUNTER — HEALTH MAINTENANCE LETTER (OUTPATIENT)
Age: 78
End: 2023-07-02